# Patient Record
Sex: FEMALE | Race: WHITE
[De-identification: names, ages, dates, MRNs, and addresses within clinical notes are randomized per-mention and may not be internally consistent; named-entity substitution may affect disease eponyms.]

---

## 2018-05-18 ENCOUNTER — HOSPITAL ENCOUNTER (OUTPATIENT)
Dept: HOSPITAL 45 - C.CTS | Age: 63
Discharge: HOME | End: 2018-05-18
Payer: COMMERCIAL

## 2018-05-18 DIAGNOSIS — M77.8: ICD-10-CM

## 2018-05-18 DIAGNOSIS — M19.272: ICD-10-CM

## 2018-05-18 DIAGNOSIS — M24.875: Primary | ICD-10-CM

## 2018-05-18 NOTE — DIAGNOSTIC IMAGING REPORT
LEFT FOOT CT



CT DOSE: 182.12 mGy.cm



HISTORY:      Nonhealing left fifth metatarsal.



TECHNIQUE: Multiaxial CT images of the left foot were performed and reformatted

in the sagittal and coronal plane without the use of contrast.  A dose lowering

technique was utilized adhering to the principles of ALARA.



COMPARISON:  None.



FINDINGS: There are small ossific density seen adjacent to the medial and

lateral malleoli consistent with old avulsion injuries. No acute fracture or

dislocation within the left foot. There is an oblique fracture at the neck of

the fifth metatarsal which demonstrates complete bony bridging. Therefore, this

is considered to be healed. This demonstrates slight medial angulation and

displacement. No cortical erosions or bony destruction to suggest osteomyelitis.

Small posterior calcaneal spur.



IMPRESSION:  

Healed oblique fracture at the neck of the fifth metatarsal.







Electronically signed by:  Hardeep Tompkins M.D.

5/18/2018 2:19 PM



Dictated Date/Time:  5/18/2018 2:11 PM